# Patient Record
Sex: MALE | Race: WHITE
[De-identification: names, ages, dates, MRNs, and addresses within clinical notes are randomized per-mention and may not be internally consistent; named-entity substitution may affect disease eponyms.]

---

## 2019-04-15 ENCOUNTER — HOSPITAL ENCOUNTER (OUTPATIENT)
Dept: HOSPITAL 53 - M RAD | Age: 49
End: 2019-04-15
Attending: SURGERY
Payer: COMMERCIAL

## 2019-04-15 DIAGNOSIS — S69.92XA: Primary | ICD-10-CM
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
## 2019-04-15 NOTE — REP
LEFT FINGERS, FOUR VIEWS:

 

HISTORY: 4th digit injury.

 

There is no acute fracture or dislocation.  The joint spaces are normal in

appearance.

 

IMPRESSION:

 

There is no acute fracture or dislocation.

 

 

Electronically Signed by

Sb León MD 04/15/2019 10:50 A